# Patient Record
Sex: MALE | Race: WHITE | HISPANIC OR LATINO | Employment: STUDENT | URBAN - METROPOLITAN AREA
[De-identification: names, ages, dates, MRNs, and addresses within clinical notes are randomized per-mention and may not be internally consistent; named-entity substitution may affect disease eponyms.]

---

## 2023-03-21 LAB
ALBUMIN (G/DL) IN SER/PLAS: 4.7 G/DL (ref 3.4–5)
ANION GAP IN SER/PLAS: 15 MMOL/L (ref 10–20)
CALCIUM (MG/DL) IN SER/PLAS: 9.5 MG/DL (ref 8.6–10.6)
CARBON DIOXIDE, TOTAL (MMOL/L) IN SER/PLAS: 26 MMOL/L (ref 21–32)
CHLORIDE (MMOL/L) IN SER/PLAS: 104 MMOL/L (ref 98–107)
CREATININE (MG/DL) IN SER/PLAS: 0.91 MG/DL (ref 0.5–1.3)
GFR MALE: >90 ML/MIN/1.73M2
GLUCOSE (MG/DL) IN SER/PLAS: 68 MG/DL (ref 74–99)
HIV 1/ 2 AG/AB SCREEN: NONREACTIVE
PHOSPHATE (MG/DL) IN SER/PLAS: 4.4 MG/DL (ref 2.5–4.9)
POTASSIUM (MMOL/L) IN SER/PLAS: 4.2 MMOL/L (ref 3.5–5.3)
SODIUM (MMOL/L) IN SER/PLAS: 141 MMOL/L (ref 136–145)
SYPHILIS TOTAL AB: NONREACTIVE
UREA NITROGEN (MG/DL) IN SER/PLAS: 16 MG/DL (ref 6–23)

## 2023-03-22 LAB
CHLAMYDIA TRACH., AMPLIFIED: POSITIVE
N. GONORRHEA, AMPLIFIED: POSITIVE

## 2023-07-03 ENCOUNTER — TELEPHONE (OUTPATIENT)
Dept: PRIMARY CARE | Facility: CLINIC | Age: 23
End: 2023-07-03

## 2023-07-03 DIAGNOSIS — F32.A DEPRESSION, UNSPECIFIED DEPRESSION TYPE: ICD-10-CM

## 2023-07-03 RX ORDER — ESCITALOPRAM OXALATE 10 MG/1
10 TABLET ORAL DAILY
Qty: 90 TABLET | Refills: 1 | Status: SHIPPED | OUTPATIENT
Start: 2023-07-03 | End: 2023-07-07 | Stop reason: SDUPTHER

## 2023-07-03 RX ORDER — ESCITALOPRAM OXALATE 10 MG/1
1 TABLET ORAL DAILY
COMMUNITY
Start: 2021-12-27 | End: 2023-07-03 | Stop reason: SDUPTHER

## 2023-07-03 NOTE — TELEPHONE ENCOUNTER
Pt left a msg requesting a refill of his Escitalopram.  He will be going out of town on Sunday.  Pharm is [facility].

## 2023-07-07 DIAGNOSIS — F32.A DEPRESSION, UNSPECIFIED DEPRESSION TYPE: ICD-10-CM

## 2023-07-07 RX ORDER — ESCITALOPRAM OXALATE 10 MG/1
10 TABLET ORAL DAILY
Qty: 30 TABLET | Refills: 0 | Status: SHIPPED | OUTPATIENT
Start: 2023-07-07 | End: 2023-08-22 | Stop reason: SDUPTHER

## 2023-08-21 ENCOUNTER — TELEPHONE (OUTPATIENT)
Dept: PRIMARY CARE | Facility: CLINIC | Age: 23
End: 2023-08-21

## 2023-08-21 NOTE — TELEPHONE ENCOUNTER
Pt carla a msg stating that he was not getting his Escitalopram from [facility] due to his name.  I had the chart changed to reflect what [facility] has now.  Pt is requesting we re-send his script now.

## 2023-08-22 DIAGNOSIS — F32.A DEPRESSION, UNSPECIFIED DEPRESSION TYPE: ICD-10-CM

## 2023-08-22 LAB
ALBUMIN (G/DL) IN SER/PLAS: 5.3 G/DL (ref 3.4–5)
ANION GAP IN SER/PLAS: 19 MMOL/L (ref 10–20)
CALCIUM (MG/DL) IN SER/PLAS: 10 MG/DL (ref 8.6–10.6)
CARBON DIOXIDE, TOTAL (MMOL/L) IN SER/PLAS: 24 MMOL/L (ref 21–32)
CHLORIDE (MMOL/L) IN SER/PLAS: 99 MMOL/L (ref 98–107)
CREATININE (MG/DL) IN SER/PLAS: 0.8 MG/DL (ref 0.5–1.3)
GFR MALE: >90 ML/MIN/1.73M2
GLUCOSE (MG/DL) IN SER/PLAS: 64 MG/DL (ref 74–99)
HIV 1/ 2 AG/AB SCREEN: NONREACTIVE
PHOSPHATE (MG/DL) IN SER/PLAS: 3.9 MG/DL (ref 2.5–4.9)
POTASSIUM (MMOL/L) IN SER/PLAS: 3.7 MMOL/L (ref 3.5–5.3)
SODIUM (MMOL/L) IN SER/PLAS: 138 MMOL/L (ref 136–145)
SYPHILIS TOTAL AB: NONREACTIVE
UREA NITROGEN (MG/DL) IN SER/PLAS: 18 MG/DL (ref 6–23)

## 2023-08-22 RX ORDER — ESCITALOPRAM OXALATE 10 MG/1
10 TABLET ORAL DAILY
Qty: 90 TABLET | Refills: 3 | Status: SHIPPED | OUTPATIENT
Start: 2023-08-22 | End: 2023-12-18 | Stop reason: DRUGHIGH

## 2023-08-23 LAB
CHLAMYDIA TRACH., AMPLIFIED: NEGATIVE
N. GONORRHEA, AMPLIFIED: NEGATIVE
N. GONORRHEA, AMPLIFIED: NEGATIVE
N. GONORRHEA, AMPLIFIED: POSITIVE

## 2023-10-18 DIAGNOSIS — Z77.21 PERSONAL HISTORY OF EXPOSURE TO POTENTIALLY HAZARDOUS BODY FLUIDS: ICD-10-CM

## 2023-11-20 ENCOUNTER — CLINICAL SUPPORT (OUTPATIENT)
Dept: IMMUNOLOGY | Facility: CLINIC | Age: 23
End: 2023-11-20
Payer: COMMERCIAL

## 2023-11-20 DIAGNOSIS — Z77.21 PERSONAL HISTORY OF EXPOSURE TO POTENTIALLY HAZARDOUS BODY FLUIDS: ICD-10-CM

## 2023-11-20 LAB
ALBUMIN SERPL BCP-MCNC: 4.9 G/DL (ref 3.4–5)
ANION GAP SERPL CALC-SCNC: 14 MMOL/L (ref 10–20)
BUN SERPL-MCNC: 11 MG/DL (ref 6–23)
CALCIUM SERPL-MCNC: 9.6 MG/DL (ref 8.6–10.6)
CHLORIDE SERPL-SCNC: 103 MMOL/L (ref 98–107)
CO2 SERPL-SCNC: 29 MMOL/L (ref 21–32)
CREAT SERPL-MCNC: 0.95 MG/DL (ref 0.5–1.3)
GFR SERPL CREATININE-BSD FRML MDRD: >90 ML/MIN/1.73M*2
GLUCOSE SERPL-MCNC: 106 MG/DL (ref 74–99)
HIV 1+2 AB+HIV1 P24 AG SERPL QL IA: NONREACTIVE
PHOSPHATE SERPL-MCNC: 3.4 MG/DL (ref 2.5–4.9)
POTASSIUM SERPL-SCNC: 3.9 MMOL/L (ref 3.5–5.3)
SODIUM SERPL-SCNC: 142 MMOL/L (ref 136–145)
T PALLIDUM AB SER QL: NONREACTIVE

## 2023-11-20 PROCEDURE — 87800 DETECT AGNT MULT DNA DIREC: CPT

## 2023-11-20 PROCEDURE — 80069 RENAL FUNCTION PANEL: CPT

## 2023-11-20 PROCEDURE — 86780 TREPONEMA PALLIDUM: CPT

## 2023-11-20 PROCEDURE — 36415 COLL VENOUS BLD VENIPUNCTURE: CPT

## 2023-11-20 PROCEDURE — 87389 HIV-1 AG W/HIV-1&-2 AB AG IA: CPT

## 2023-11-21 LAB
C TRACH RRNA SPEC QL NAA+PROBE: NEGATIVE
N GONORRHOEA DNA SPEC QL PROBE+SIG AMP: NEGATIVE

## 2023-12-12 ENCOUNTER — TELEPHONE (OUTPATIENT)
Dept: PRIMARY CARE | Facility: CLINIC | Age: 23
End: 2023-12-12
Payer: COMMERCIAL

## 2023-12-12 DIAGNOSIS — Z77.21 PERSONAL HISTORY OF EXPOSURE TO POTENTIALLY HAZARDOUS BODY FLUIDS: ICD-10-CM

## 2023-12-15 ENCOUNTER — CLINICAL SUPPORT (OUTPATIENT)
Dept: IMMUNOLOGY | Facility: CLINIC | Age: 23
End: 2023-12-15
Payer: COMMERCIAL

## 2023-12-15 DIAGNOSIS — Z77.21 PERSONAL HISTORY OF EXPOSURE TO POTENTIALLY HAZARDOUS BODY FLUIDS: ICD-10-CM

## 2023-12-15 LAB — T PALLIDUM AB SER QL: NONREACTIVE

## 2023-12-15 PROCEDURE — 87800 DETECT AGNT MULT DNA DIREC: CPT

## 2023-12-15 PROCEDURE — 86780 TREPONEMA PALLIDUM: CPT

## 2023-12-15 PROCEDURE — 36415 COLL VENOUS BLD VENIPUNCTURE: CPT

## 2023-12-16 LAB
C TRACH RRNA SPEC QL NAA+PROBE: NEGATIVE
N GONORRHOEA DNA SPEC QL PROBE+SIG AMP: NEGATIVE

## 2023-12-18 ENCOUNTER — TELEMEDICINE (OUTPATIENT)
Dept: PRIMARY CARE | Facility: CLINIC | Age: 23
End: 2023-12-18
Payer: COMMERCIAL

## 2023-12-18 DIAGNOSIS — F32.A DEPRESSION, UNSPECIFIED DEPRESSION TYPE: Primary | ICD-10-CM

## 2023-12-18 PROCEDURE — 99213 OFFICE O/P EST LOW 20 MIN: CPT | Performed by: INTERNAL MEDICINE

## 2023-12-18 RX ORDER — ESCITALOPRAM OXALATE 20 MG/1
20 TABLET ORAL DAILY
Qty: 90 TABLET | Refills: 2 | Status: SHIPPED | OUTPATIENT
Start: 2023-12-18 | End: 2024-06-15

## 2023-12-18 NOTE — PROGRESS NOTES
Subjective   Patient ID: KIRK Hugo is a 23 y.o. male who presents for No chief complaint on file..    HPI   Follow-up depression.  Subtle but some increasing symptoms over the past 2 to 3 months.  No clear precipitators.  Able to function.  Doing well in school.  Exercising.  Diet good.  Sleeping well.  Denies any substance use.  Denies suicidal or homicidal thinking.    Review of Systems  All systems reviewed and negative except as per history of present illness  Objective   There were no vitals taken for this visit.    Physical Exam    Assessment/Plan   Problem List Items Addressed This Visit    None  Visit Diagnoses         Codes    Depression, unspecified depression type    -  Primary F32.A    Relevant Medications    escitalopram (Lexapro) 20 mg tablet        Some increase signs and symptoms.  Will increase escitalopram.  Reviewed risk of suicidal thoughts over the first several weeks to months.  Continued exercise.  Recommend continued follow-up with psychology.  Follow-up 2 to 3 months.

## 2024-01-15 DIAGNOSIS — Z77.21 PERSONAL HISTORY OF EXPOSURE TO POTENTIALLY HAZARDOUS BODY FLUIDS: ICD-10-CM

## 2024-01-16 ENCOUNTER — CLINICAL SUPPORT (OUTPATIENT)
Dept: IMMUNOLOGY | Facility: CLINIC | Age: 24
End: 2024-01-16
Payer: COMMERCIAL

## 2024-01-16 DIAGNOSIS — Z77.21 PERSONAL HISTORY OF EXPOSURE TO POTENTIALLY HAZARDOUS BODY FLUIDS: ICD-10-CM

## 2024-01-16 LAB
ALBUMIN SERPL BCP-MCNC: 5.1 G/DL (ref 3.4–5)
ANION GAP SERPL CALC-SCNC: 14 MMOL/L (ref 10–20)
BUN SERPL-MCNC: 14 MG/DL (ref 6–23)
CALCIUM SERPL-MCNC: 9.5 MG/DL (ref 8.6–10.6)
CHLORIDE SERPL-SCNC: 102 MMOL/L (ref 98–107)
CO2 SERPL-SCNC: 28 MMOL/L (ref 21–32)
CREAT SERPL-MCNC: 0.78 MG/DL (ref 0.5–1.3)
EGFRCR SERPLBLD CKD-EPI 2021: >90 ML/MIN/1.73M*2
GLUCOSE SERPL-MCNC: 117 MG/DL (ref 74–99)
HIV 1+2 AB+HIV1 P24 AG SERPL QL IA: NONREACTIVE
PHOSPHATE SERPL-MCNC: 4 MG/DL (ref 2.5–4.9)
POTASSIUM SERPL-SCNC: 4.3 MMOL/L (ref 3.5–5.3)
SODIUM SERPL-SCNC: 140 MMOL/L (ref 136–145)
TREPONEMA PALLIDUM IGG+IGM AB [PRESENCE] IN SERUM OR PLASMA BY IMMUNOASSAY: NONREACTIVE

## 2024-01-16 PROCEDURE — 80069 RENAL FUNCTION PANEL: CPT

## 2024-01-16 PROCEDURE — 36415 COLL VENOUS BLD VENIPUNCTURE: CPT

## 2024-01-16 PROCEDURE — 87800 DETECT AGNT MULT DNA DIREC: CPT

## 2024-01-16 PROCEDURE — 87389 HIV-1 AG W/HIV-1&-2 AB AG IA: CPT

## 2024-01-16 PROCEDURE — 86780 TREPONEMA PALLIDUM: CPT

## 2024-01-17 LAB
C TRACH RRNA SPEC QL NAA+PROBE: NEGATIVE
N GONORRHOEA DNA SPEC QL PROBE+SIG AMP: NEGATIVE

## 2024-03-11 ENCOUNTER — OFFICE VISIT (OUTPATIENT)
Dept: IMMUNOLOGY | Facility: CLINIC | Age: 24
End: 2024-03-11
Payer: COMMERCIAL

## 2024-03-11 VITALS
RESPIRATION RATE: 16 BRPM | OXYGEN SATURATION: 97 % | BODY MASS INDEX: 25.69 KG/M2 | SYSTOLIC BLOOD PRESSURE: 133 MMHG | TEMPERATURE: 94.3 F | DIASTOLIC BLOOD PRESSURE: 81 MMHG | WEIGHT: 164 LBS | HEART RATE: 65 BPM

## 2024-03-11 DIAGNOSIS — Z77.21 EXPOSURE TO POTENTIALLY HAZARDOUS BODY FLUIDS: ICD-10-CM

## 2024-03-11 DIAGNOSIS — Z79.899 HIGH RISK MEDICATION USE: ICD-10-CM

## 2024-03-11 LAB
ALBUMIN SERPL BCP-MCNC: 5.1 G/DL (ref 3.4–5)
ANION GAP SERPL CALC-SCNC: 12 MMOL/L (ref 10–20)
BUN SERPL-MCNC: 10 MG/DL (ref 6–23)
CALCIUM SERPL-MCNC: 9.9 MG/DL (ref 8.6–10.6)
CHLORIDE SERPL-SCNC: 101 MMOL/L (ref 98–107)
CO2 SERPL-SCNC: 31 MMOL/L (ref 21–32)
CREAT SERPL-MCNC: 0.79 MG/DL (ref 0.5–1.3)
EGFRCR SERPLBLD CKD-EPI 2021: >90 ML/MIN/1.73M*2
GLUCOSE SERPL-MCNC: 94 MG/DL (ref 74–99)
HIV 1+2 AB+HIV1 P24 AG SERPL QL IA: NONREACTIVE
PHOSPHATE SERPL-MCNC: 2.9 MG/DL (ref 2.5–4.9)
POTASSIUM SERPL-SCNC: 4.1 MMOL/L (ref 3.5–5.3)
SODIUM SERPL-SCNC: 140 MMOL/L (ref 136–145)
TREPONEMA PALLIDUM IGG+IGM AB [PRESENCE] IN SERUM OR PLASMA BY IMMUNOASSAY: NONREACTIVE

## 2024-03-11 PROCEDURE — 99214 OFFICE O/P EST MOD 30 MIN: CPT | Performed by: NURSE PRACTITIONER

## 2024-03-11 PROCEDURE — 36415 COLL VENOUS BLD VENIPUNCTURE: CPT | Performed by: NURSE PRACTITIONER

## 2024-03-11 PROCEDURE — 87800 DETECT AGNT MULT DNA DIREC: CPT | Performed by: NURSE PRACTITIONER

## 2024-03-11 PROCEDURE — 80069 RENAL FUNCTION PANEL: CPT | Performed by: NURSE PRACTITIONER

## 2024-03-11 PROCEDURE — 1036F TOBACCO NON-USER: CPT | Performed by: NURSE PRACTITIONER

## 2024-03-11 PROCEDURE — 87389 HIV-1 AG W/HIV-1&-2 AB AG IA: CPT | Performed by: NURSE PRACTITIONER

## 2024-03-11 PROCEDURE — 86780 TREPONEMA PALLIDUM: CPT | Performed by: NURSE PRACTITIONER

## 2024-03-11 RX ORDER — ALBUTEROL SULFATE 90 UG/1
AEROSOL, METERED RESPIRATORY (INHALATION)
COMMUNITY
Start: 2016-09-21

## 2024-03-11 RX ORDER — EMTRICITABINE AND TENOFOVIR ALAFENAMIDE 200; 25 MG/1; MG/1
TABLET ORAL
COMMUNITY
Start: 2022-01-11 | End: 2024-03-27 | Stop reason: SDUPTHER

## 2024-03-11 ASSESSMENT — PAIN SCALES - GENERAL: PAINLEVEL: 0-NO PAIN

## 2024-03-11 NOTE — LETTER
03/12/24    Haydee Green MD  [address]  [facility]  [facility] [address]      Dear Dr. Haydee Green MD,    I am writing to confirm that your patient, Sidney Hugo, received care in my office on 03/12/24. I have enclosed a summary of the care provided to Sidney for your reference.    Please contact me with any questions you may have regarding the visit.    Sincerely,         Jasmyn Elliott, APRN-CNP  [address]  [address]  [facility] [address] [phone]    CC: No Recipients

## 2024-03-11 NOTE — PROGRESS NOTES
HIV PrEP Clinic Follow-up Visit:    S. Sidney Hugo is a 23 y.o. male being seen for PrEP for prevention of HIV infection.  Current PrEP therapy:  Descovy    Risk factors for HIV infection include: (select all that apply):  MSM    Regular condom use? Never  Received treatment for STD since last seen? No    Past Medical History  History reviewed. No pertinent past medical history.    Allergies  No Known Allergies    Current Medications:    Current Outpatient Medications:     albuterol 90 mcg/actuation inhaler, Inhale., Disp: , Rfl:     Descovy 200-25 mg tablet, Take by mouth., Disp: , Rfl:     escitalopram (Lexapro) 20 mg tablet, Take 1 tablet (20 mg) by mouth once daily., Disp: 90 tablet, Rfl: 2    Immunizations:  Immunization History   Administered Date(s) Administered    DTP 02/07/2001, 03/29/2001, 06/05/2001, 12/17/2001, 08/07/2006    Flu vaccine (IIV4), preservative free *Check age/dose* 10/13/2020, 10/12/2021    HPV 9-valent vaccine (GARDASIL 9) 08/18/2017    HPV, Quadrivalent 09/21/2016    Hepatitis B vaccine, adult (RECOMBIVAX, ENGERIX) 2000, 02/07/2001, 09/05/2001    MMR vaccine, subcutaneous (MMR II) 12/17/2001, 08/07/2006    Meningococcal MCV4P 04/29/2014, 06/21/2017    OPV 02/07/2001, 03/29/2001, 06/05/2001, 12/17/2001    PPD Test 05/18/2014, 06/11/2014    Pfizer Purple Cap SARS-CoV-2 04/01/2021, 04/22/2021    Pneumococcal polysaccharide vaccine, 23-valent, age 2 years and older (PNEUMOVAX 23) 02/07/2001, 03/29/2001, 06/05/2001    Poliovirus vaccine, subcutaneous (IPOL) 02/07/2001, 03/29/2001, 09/05/2001, 08/07/2006    Tdap vaccine, age 7 year and older (BOOSTRIX, ADACEL) 12/09/2011    Varicella vaccine, subcutaneous (VARIVAX) 12/17/2001, 08/07/2006       Review of systems  Review of Systems   Constitutional: Negative.    HENT: Negative.     Eyes: Negative.    Respiratory: Negative.     Cardiovascular: Negative.    Gastrointestinal: Negative.    Endocrine: Negative.    Genitourinary:  "Negative.    Musculoskeletal: Negative.    Allergic/Immunologic: Negative.    Neurological: Negative.    Hematological: Negative.    Psychiatric/Behavioral: Negative.         PHYSICAL EXAMINATION:  Visit Vitals  /81   Pulse 65   Temp 34.6 °C (94.3 °F)   Resp 16   Wt 74.4 kg (164 lb)   SpO2 97%   BMI 25.69 kg/m²   Smoking Status Never   BSA 1.88 m²       Physical Exam   Physical Exam  Vitals reviewed.   Constitutional:       Appearance: Normal appearance.   HENT:      Head: Normocephalic.   Cardiovascular:      Rate and Rhythm: Normal rate and regular rhythm.      Heart sounds: Normal heart sounds.   Pulmonary:      Effort: Pulmonary effort is normal.      Breath sounds: Normal breath sounds.   Abdominal:      General: Bowel sounds are normal.      Palpations: Abdomen is soft.   Musculoskeletal:         General: Normal range of motion.      Cervical back: Neck supple.   Skin:     General: Skin is warm and dry.   Neurological:      Mental Status: He is alert and oriented to person, place, and time.   Psychiatric:         Mood and Affect: Mood normal.         Pertinent data review:  HIV 1/2 Antigen/Antibody Screen with Reflex to Confirmation   Date Value Ref Range Status   01/16/2024 Nonreactive Nonreactive Final     Syphilis Total Ab   Date Value Ref Range Status   01/16/2024 Nonreactive Nonreactive Final     Comment:     No significant level of Treponema pallidum antibody detected.   Repeat testing in 2 to 4 weeks may be considered if early   infection or incubating syphilis infection is suspected.     No results found for: \"VDRLCSF\"  No results found for: \"RPR\"  Hepatitis C Ab   Date Value Ref Range Status   10/13/2020 NONREACTIVE NONREACTIVE Final     Comment:      Results from patients taking biotin supplements or receiving   high-dose biotin therapy should be interpreted with caution   due to possible interference with this test. Providers may    contact their local laboratory for further information.   "     Hepatitis B Surface Ag   Date Value Ref Range Status   10/13/2020 NONREACTIVE NONREACTIVE Final     Comment:      Biotin interference may cause falsely decreased results.   Patients taking a Biotin dose of up to 5 mg/day should   refrain from taking Biotin for 24 hours before sample   collection. Providers may contact their local laboratory   for further information.       Hepatitis B Surface Ab   Date Value Ref Range Status   10/13/2020 <3.1 <10 mIU/mL Final     Comment:     INTERPRETIVE CRITERIA:  <10 mIU/mL....NONREACTIVE    >=10 mIU/mL...REACTIVE   .   Biotin interference may cause falsely decreased results.   Patients taking a Biotin dose of up to 5 mg/day should   refrain from taking Biotin for 24 hours before sample   collection. Providers may contact their local laboratory   for further information.       Hep A Total Ab Interp   Date Value Ref Range Status   10/13/2020 NONREACTIVE NONREACTIVE Final     Comment:      Biotin interference may cause falsely elevated results.    Patients taking a Biotin dose of up to 5 mg/day should    refrain from taking Biotin for 24 hours before sample    collection. Providers may contact their local laboratory   for further information.       Glucose   Date Value Ref Range Status   01/16/2024 117 (H) 74 - 99 mg/dL Final     Sodium   Date Value Ref Range Status   01/16/2024 140 136 - 145 mmol/L Final     Potassium   Date Value Ref Range Status   01/16/2024 4.3 3.5 - 5.3 mmol/L Final     Chloride   Date Value Ref Range Status   01/16/2024 102 98 - 107 mmol/L Final     Anion Gap   Date Value Ref Range Status   01/16/2024 14 10 - 20 mmol/L Final     Urea Nitrogen   Date Value Ref Range Status   01/16/2024 14 6 - 23 mg/dL Final     Creatinine   Date Value Ref Range Status   01/16/2024 0.78 0.50 - 1.30 mg/dL Final     eGFR   Date Value Ref Range Status   01/16/2024 >90 >60 mL/min/1.73m*2 Final     Comment:     Calculations of estimated GFR are performed using the 2021 CKD-EPI  Study Refit equation without the race variable for the IDMS-Traceable creatinine methods.  https://jasn.asnjournals.org/content/early/2021/09/22/ASN.3207105387     Calcium   Date Value Ref Range Status   01/16/2024 9.5 8.6 - 10.6 mg/dL Final     Phosphorus   Date Value Ref Range Status   01/16/2024 4.0 2.5 - 4.9 mg/dL Final     Comment:     The performance characteristics of phosphorus testing in heparinized plasma have been validated by the individual  laboratory site where testing is performed. Testing on heparinized plasma is not approved by the FDA; however, such approval is not necessary.     Albumin   Date Value Ref Range Status   01/16/2024 5.1 (H) 3.4 - 5.0 g/dL Final       Assessment and Plan:  KIRK Hugo is a 23 y.o.male seen today for evaluation of appropriateness for continuation of  PrEP therapy as they continue to be at greater risk for acquiring HIV infection.  HIV Ag/Ab, Syphilis testing, and renal function will be collected today.  GC NAAT testing will be obtained from 2 sites, throat, rectum.  If HIV testing negative, prescription for PrEP will be renewed.    Routine blood and swabs today.  Labs again in 3 months.  Face to face in 6 months.   He is interested in doxy prep - will prescribe for him.  Problem List Items Addressed This Visit    None  Visit Diagnoses       Exposure to potentially hazardous body fluids        Relevant Orders    C. Trachomatis / N. Gonorrhoeae, Amplified Detection    HIV 1/2 Antigen/Antibody Screen with Reflex to Confirmation    Syphilis Screen with Reflex    C. Trachomatis / N. Gonorrhoeae, Amplified Detection    C. Trachomatis / N. Gonorrhoeae, Amplified Detection    High risk medication use        Relevant Orders    Renal Function Panel        Thank you for coming in to see us.   We will see you face to face again in 6 months.   I will be ordering Doxy Prep for you.    Please call with any questions or concerns.     Jasmyn Elliott  APRN-CNP

## 2024-03-12 DIAGNOSIS — Z77.21 PERSONAL HISTORY OF EXPOSURE TO POTENTIALLY HAZARDOUS BODY FLUIDS: Primary | ICD-10-CM

## 2024-03-12 LAB
C TRACH RRNA SPEC QL NAA+PROBE: NEGATIVE
N GONORRHOEA DNA SPEC QL PROBE+SIG AMP: NEGATIVE

## 2024-03-12 RX ORDER — DOXYCYCLINE HYCLATE 100 MG
TABLET ORAL
Qty: 30 TABLET | Refills: 0 | Status: SHIPPED | OUTPATIENT
Start: 2024-03-12

## 2024-03-12 ASSESSMENT — ENCOUNTER SYMPTOMS
GASTROINTESTINAL NEGATIVE: 1
NEUROLOGICAL NEGATIVE: 1
ENDOCRINE NEGATIVE: 1
PSYCHIATRIC NEGATIVE: 1
CARDIOVASCULAR NEGATIVE: 1
EYES NEGATIVE: 1
MUSCULOSKELETAL NEGATIVE: 1
ALLERGIC/IMMUNOLOGIC NEGATIVE: 1
CONSTITUTIONAL NEGATIVE: 1
HEMATOLOGIC/LYMPHATIC NEGATIVE: 1
RESPIRATORY NEGATIVE: 1

## 2024-03-18 DIAGNOSIS — J34.2 DEVIATED SEPTUM: Primary | ICD-10-CM

## 2024-03-27 DIAGNOSIS — Z77.21 PERSONAL HISTORY OF EXPOSURE TO POTENTIALLY HAZARDOUS BODY FLUIDS: Primary | ICD-10-CM

## 2024-03-27 RX ORDER — EMTRICITABINE AND TENOFOVIR ALAFENAMIDE 200; 25 MG/1; MG/1
1 TABLET ORAL DAILY
Qty: 30 TABLET | Refills: 2 | Status: SHIPPED | OUTPATIENT
Start: 2024-03-27 | End: 2024-04-26

## 2024-05-03 ENCOUNTER — TELEMEDICINE (OUTPATIENT)
Dept: PRIMARY CARE | Facility: CLINIC | Age: 24
End: 2024-05-03
Payer: COMMERCIAL

## 2024-05-03 DIAGNOSIS — K52.9 CHRONIC DIARRHEA OF UNKNOWN ORIGIN: Primary | ICD-10-CM

## 2024-05-03 PROBLEM — A74.9 CHLAMYDIA: Status: RESOLVED | Noted: 2024-05-03 | Resolved: 2024-05-03

## 2024-05-03 PROBLEM — A54.9 GONORRHEA: Status: RESOLVED | Noted: 2024-05-03 | Resolved: 2024-05-03

## 2024-05-03 PROBLEM — J45.990 EXERCISE-INDUCED ASTHMA (HHS-HCC): Status: ACTIVE | Noted: 2024-05-03

## 2024-05-03 PROBLEM — D72.829 ELEVATED WBC COUNT: Status: RESOLVED | Noted: 2024-05-03 | Resolved: 2024-05-03

## 2024-05-03 PROBLEM — F32.A DEPRESSION: Status: ACTIVE | Noted: 2024-05-03

## 2024-05-03 PROBLEM — D50.9 IRON DEFICIENCY ANEMIA: Status: RESOLVED | Noted: 2024-05-03 | Resolved: 2024-05-03

## 2024-05-03 PROCEDURE — 1036F TOBACCO NON-USER: CPT | Performed by: FAMILY MEDICINE

## 2024-05-03 PROCEDURE — 99214 OFFICE O/P EST MOD 30 MIN: CPT | Performed by: FAMILY MEDICINE

## 2024-05-03 ASSESSMENT — ENCOUNTER SYMPTOMS
VOMITING: 0
DIARRHEA: 1
WEIGHT LOSS: 1

## 2024-05-03 NOTE — PROGRESS NOTES
"Subjective   Patient ID: KIRK Hugo \"Purvi" is a 23 y.o. male who presents for Diarrhea.    KIRK Hugo presents for a scheduled Telemedicine visit for chronic diarrhea associated with weight loss. . Patient seen via synchronous audio and video platform. Patient is located at home and I am in my office. Patient consents to being seen via telemedicine platform, understands the limitations of the platform, and understands that he can be seen in office if preferred.     On 4/12, woke up with stomach pain and diarrhea. Was improving. For a few days earlier this week. Fever started up on 4/29 to 4/30. That cleared up. Feeling ok physically. Diarrhea now is up to 3-4 times a day, large volume in AM, no blood currently but had some initially. Mucous occasionally. Nausea occasionally, mostly the first weekend. Vomitied when it first began.     Diarrhea   Chronicity: since 4/12. The current episode started 1 to 4 weeks ago. The problem occurs 2 to 4 times per day. The problem has been gradually worsening. The patient states that diarrhea awakens him from sleep. Associated symptoms include weight loss. Pertinent negatives include no vomiting. Associated symptoms comments: Has lost per his scale 10-12 lbs in 3 weeks  Not taking any medication now. .          Current Outpatient Medications:     albuterol 90 mcg/actuation inhaler, Inhale., Disp: , Rfl:     doxycycline (Vibra-Tabs) 100 mg tablet, Take 2 tablets after unprotected sexual encounter. Not to be taken daily., Disp: 30 tablet, Rfl: 0    escitalopram (Lexapro) 20 mg tablet, Take 1 tablet (20 mg) by mouth once daily., Disp: 90 tablet, Rfl: 2    Patient Active Problem List   Diagnosis    Depression    Exercise-induced asthma ([facility])    Chronic diarrhea of unknown origin         Review of Systems   Constitutional:  Positive for weight loss.   Gastrointestinal:  Positive for diarrhea. Negative for vomiting.       Objective   There were no " vitals taken for this visit.    Physical Exam  Constitutional:       Appearance: Normal appearance.   Pulmonary:      Effort: Pulmonary effort is normal.   Neurological:      Mental Status: He is alert.   Psychiatric:         Mood and Affect: Mood normal.         Behavior: Behavior normal.         Assessment/Plan   Problem List Items Addressed This Visit       Chronic diarrhea of unknown origin - Primary     Since 4/12. Had improved some but now is back, febrile for couple days this week but better. Wakes him up Has lost 10-12 lbs. Discussed importance of in person eval, differential including infection, inflammatory bowel disease, other systemic issues. He agreed to go to Urgent Care today as cannot be seen by his PCP today.               Assessment, plans, tests, and follow up discussed with patient and patient verbalized understanding. KIRK Little was given an opportunity to ask questions and  any concerns were addressed including but not limited to diffferential, red flags (weight loss, waking up at night, fever,), and importance of prompt in person evaluation. .

## 2024-05-03 NOTE — ASSESSMENT & PLAN NOTE
Since 4/12. Had improved some but now is back, febrile for couple days this week but better. Wakes him up Has lost 10-12 lbs. Discussed importance of in person eval, differential including infection, inflammatory bowel disease, other systemic issues. He agreed to go to Urgent Care today as cannot be seen by his PCP today.

## 2024-05-09 ENCOUNTER — OFFICE VISIT (OUTPATIENT)
Dept: OTOLARYNGOLOGY | Facility: CLINIC | Age: 24
End: 2024-05-09
Payer: COMMERCIAL

## 2024-05-09 VITALS — BODY MASS INDEX: 24.28 KG/M2 | WEIGHT: 155 LBS

## 2024-05-09 DIAGNOSIS — G47.33 OSA (OBSTRUCTIVE SLEEP APNEA): ICD-10-CM

## 2024-05-09 DIAGNOSIS — M95.0 ACQUIRED DEFORMITY OF NOSE: Primary | ICD-10-CM

## 2024-05-09 DIAGNOSIS — J34.2 DEVIATED SEPTUM: ICD-10-CM

## 2024-05-09 DIAGNOSIS — M95.0 NASAL VALVE COLLAPSE: ICD-10-CM

## 2024-05-09 PROCEDURE — 99204 OFFICE O/P NEW MOD 45 MIN: CPT | Performed by: GENERAL PRACTICE

## 2024-05-09 PROCEDURE — 31231 NASAL ENDOSCOPY DX: CPT | Performed by: GENERAL PRACTICE

## 2024-05-13 NOTE — PROGRESS NOTES
Otolaryngology - Head and Neck Surgery Outpatient New Patient Visit Note        Assessment/Plan   Problem List Items Addressed This Visit    None  Visit Diagnoses         Codes    Acquired deformity of nose    -  Primary M95.0    Deviated septum     J34.2    Nasal valve collapse     M95.0    AVNI (obstructive sleep apnea)     G47.33            23yoM with R>L nasal obstruction associated with severe deviated septum as well as leftward nasal deviation and L>R dynamic collapse.   Nasal obstruction contributing to witnessed sleep apnea.  No significant rhinitis/sinusitis on exam or nasal endoscopy, though some spring allergic rhinitis history.  Discussed controls with antihistamines PRN.     Discussed options to include observation, controls for rhinitis PRN, septoturbinoplasty (though this will not be able to relief all of nasal obstruction due to high and anterior deflections) or rhinoplasty.  Pt prefers septoplasty and turbinoplasty and latera graft, knowing that some residual structural causes for obstruction will remain.     Risks, benefits and indications of the procedure were discussed.  This included discussion of risks of pain, bleeding, infection, scarring with poor functional or cosmetic result, need for future procedures, or damage to surrounding structures to include regional blood vessels and nerves.  In particular, the risk of ongoing obstruction CSF leak, septal perforation was highlighted.  All questions were answered.  The patient/caregiver indicated understanding of the discussion and elected to proceed with the procedure.  Will schedule as available.       Follow up:  -plan for follow up in clinic as needed    All of the above findings, impressions, treatment planning and follow up plans were discussed with the patient who indicated understanding.  the patient was instructed to contact or return to clinic sooner if symptoms/signs persist or worsen despite the above management.      Lobito Jackson  "MD  Otolaryngology - Head and Neck Surgery            History Of Present Illness  SAlmita Hugo \"Sidney\" is a 23 y.o. male presenting for concerns of nasal obstruction.     Reports longstanding R>L nasal obstruction and mouthbreathing.    Reports some history of nasal trauma, uncertain of nasal fractures.     Reports a history of allergic rhinitis in the spring, but symptoms have not notably started yet.  Uses zyrtec PRN.   No significant sinusitis history.    Reports a history of snoring and witnessed apneic pauses.      Reports a prior hsitroy of recurrent epistaxis, but none recently.                  Past Medical History  He has a past medical history of Chlamydia (05/03/2024), Elevated WBC count (05/03/2024), Gonorrhea (05/03/2024), and Iron deficiency anemia (05/03/2024).    Surgical History  He has no past surgical history on file.     Social History  He reports that he has never smoked. He has never used smokeless tobacco. He reports current alcohol use. He reports current drug use. Drug: Marijuana.    Family History  No family history on file.     Allergies  Patient has no known allergies.    Review of Systems  ROS: Pertinent positives as noted in HPI.    - CONSTITUTIONAL: Does not report weight loss, fever or chills.    - HEENT:   Ear: Does not report tinnitus, vertigo, hearing loss, otalgia, otorrhea  Nose: Does not report  ,  , epistaxis, decreased smell  Throat: Does not report pain, dysphagia, odynophagia  Larynx: Does not report hoarseness,  difficulty breathing, pain with speaking (odynophonia)  Neck: Does not report new masses, pain, swelling  Face: Does not report sinus pain, pressure, swelling, numbness, weakness     - RESPIRATORY: Does not report SOB or cough.    - CV: Does not report palpitations or chest pain.     - GI: Does not report abdominal pain, nausea, vomiting or diarrhea.    - : Does not report dysuria or urinary frequency.    - MSK: Does not report myalgia or joint " pain.    - SKIN: Does not report rash or pruritus.    - NEUROLOGICAL: Does not report headache or syncope.    - PSYCHIATRIC: Does not report recent changes in mood. Does not report anxiety or depression.         Physical Exam:     GENERAL:   Alert & Oriented to person, place and time; Normal affect and appearance. Well developed and well nourished. Conversant & cooperative with examination.     HEAD:   Normocephalic, atraumatic. No sinus tenderness to palpation. Normal parotid bilaterally. Normal facial strength.     NEUROLOGIC:   Cranial nerves II-XII grossly intact, gait WNL. Normal mood and affect.    EYES:   Extraocular movements intact. Pupils equal, round, reactive to light and accommodation. No nystagmus, no ptosis. no scleral injection.    EAR:   Normal auricle. No discomfort or TTP with manipulation.   Handheld otoscopic exam showed normal external auditory canals bilaterally. No purulence or EAC inflammation. Minimal cerumen.   Right tympanic membrane clear and mobile without evidence of perforation, retraction or middle ear effusion.   Left tympanic membrane clear and mobile without evidence of perforation, retraction or middle ear effusion.     NOSE:   Leftward external deformity from upper 1/3.  Over projected, underrotated tip.  L>R dynamic collapse on inspiration.  . No external nasal lesions, lacerations, or scars. Nasal tip symmetrical  Nasal cavity with severe rightward deviation of septum to include far anterior and high septum, normal mucosa and turbinates. No lesions, masses, purulence or polyps.     OC/OP:   Mucous membranes moist, no masses, lesions or exudates.   Normal tongue, floor of mouth, teeth, gums, lips. Normal posterior pharyngeal wall.    Normal tonsils without erythema, exudate or obvious calculi     NECK:   No neck masses or thyroid enlargement. Trachea midline. No tenderness to palpation    LYMPHATIC:   No cervical lymphadenopathy.     RESPIRATORY:   Symmetric chest elevation &  no retractions. No significant hoarseness. No increased work of breathing.    CV:   No clubbing or cyanosis. No obvious edema    Skin:   No facial rashes, vesicles or lesions.     Extremities:   No gross abnormalities      Clinic Procedure    Nasal Endoscopy           Nasal Endoscopy Indication:    Risks, benefits, and alternatives, infection risk, bleeding risk and risk of mucosal trauma and pain were discussed with the patient. Verbal consent was obtained prior to the procedure and is detailed in the patient's record.     Procedure Note:      With the patient seated in the exam chair, the bilateral nasal cavity was topically treated with 4% lidocaine and phenylephrine.  The bilateral nasal cavity was intubated with the  flexible laryngoscope.   Nasal Endoscopy: Nasal endoscopy was successfully completed using the endoscope and the nasal mucosa, septum, turbinates, and osteomeatal complex were examined.  The spheno-ethmoid recess was examined.          Patient Status: the patient tolerated the procedure well.   Complications: there were no complications.   Significant/abnormal findings: No significant abnormal findings during the above exam, except as listed in physical exam       Information review:  External sources (notes, imaging, lab results) listed below personally reviewed to aid in medical decision making process.  -  -  -

## 2024-06-10 ENCOUNTER — APPOINTMENT (OUTPATIENT)
Dept: IMMUNOLOGY | Facility: CLINIC | Age: 24
End: 2024-06-10
Payer: COMMERCIAL

## 2024-06-11 ENCOUNTER — TELEPHONE (OUTPATIENT)
Dept: IMMUNOLOGY | Facility: CLINIC | Age: 24
End: 2024-06-11

## 2024-06-11 ENCOUNTER — OFFICE VISIT (OUTPATIENT)
Dept: IMMUNOLOGY | Facility: CLINIC | Age: 24
End: 2024-06-11
Payer: COMMERCIAL

## 2024-06-11 VITALS
BODY MASS INDEX: 24.92 KG/M2 | DIASTOLIC BLOOD PRESSURE: 76 MMHG | RESPIRATION RATE: 16 BRPM | HEIGHT: 67 IN | HEART RATE: 68 BPM | TEMPERATURE: 97.7 F | OXYGEN SATURATION: 97 % | WEIGHT: 158.8 LBS | SYSTOLIC BLOOD PRESSURE: 116 MMHG

## 2024-06-11 DIAGNOSIS — Z11.3 ROUTINE SCREENING FOR STI (SEXUALLY TRANSMITTED INFECTION): ICD-10-CM

## 2024-06-11 DIAGNOSIS — Z79.899 HIGH RISK MEDICATION USE: ICD-10-CM

## 2024-06-11 DIAGNOSIS — Z77.21 EXPOSURE TO POTENTIALLY HAZARDOUS BODY FLUIDS: ICD-10-CM

## 2024-06-11 LAB
ALBUMIN SERPL BCP-MCNC: 4.9 G/DL (ref 3.4–5)
ANION GAP SERPL CALC-SCNC: 14 MMOL/L (ref 10–20)
BUN SERPL-MCNC: 15 MG/DL (ref 6–23)
CALCIUM SERPL-MCNC: 9.6 MG/DL (ref 8.6–10.6)
CHLORIDE SERPL-SCNC: 103 MMOL/L (ref 98–107)
CO2 SERPL-SCNC: 28 MMOL/L (ref 21–32)
CREAT SERPL-MCNC: 0.78 MG/DL (ref 0.5–1.3)
EGFRCR SERPLBLD CKD-EPI 2021: >90 ML/MIN/1.73M*2
GLUCOSE SERPL-MCNC: 86 MG/DL (ref 74–99)
HIV 1+2 AB+HIV1 P24 AG SERPL QL IA: NONREACTIVE
PHOSPHATE SERPL-MCNC: 4.2 MG/DL (ref 2.5–4.9)
POTASSIUM SERPL-SCNC: 4 MMOL/L (ref 3.5–5.3)
SODIUM SERPL-SCNC: 141 MMOL/L (ref 136–145)
TREPONEMA PALLIDUM IGG+IGM AB [PRESENCE] IN SERUM OR PLASMA BY IMMUNOASSAY: NONREACTIVE

## 2024-06-11 PROCEDURE — 99214 OFFICE O/P EST MOD 30 MIN: CPT | Performed by: NURSE PRACTITIONER

## 2024-06-11 PROCEDURE — 1036F TOBACCO NON-USER: CPT | Performed by: NURSE PRACTITIONER

## 2024-06-11 PROCEDURE — 87389 HIV-1 AG W/HIV-1&-2 AB AG IA: CPT | Performed by: NURSE PRACTITIONER

## 2024-06-11 PROCEDURE — 80069 RENAL FUNCTION PANEL: CPT | Performed by: NURSE PRACTITIONER

## 2024-06-11 PROCEDURE — 36415 COLL VENOUS BLD VENIPUNCTURE: CPT | Performed by: NURSE PRACTITIONER

## 2024-06-11 PROCEDURE — 87491 CHLMYD TRACH DNA AMP PROBE: CPT | Performed by: NURSE PRACTITIONER

## 2024-06-11 PROCEDURE — 86780 TREPONEMA PALLIDUM: CPT | Performed by: NURSE PRACTITIONER

## 2024-06-11 RX ORDER — EMTRICITABINE AND TENOFOVIR ALAFENAMIDE 120; 15 MG/1; MG/1
TABLET ORAL
COMMUNITY
End: 2024-06-13 | Stop reason: ENTERED-IN-ERROR

## 2024-06-11 ASSESSMENT — ENCOUNTER SYMPTOMS
CARDIOVASCULAR NEGATIVE: 1
ENDOCRINE NEGATIVE: 1
PSYCHIATRIC NEGATIVE: 1
ALLERGIC/IMMUNOLOGIC NEGATIVE: 1
HEMATOLOGIC/LYMPHATIC NEGATIVE: 1
GASTROINTESTINAL NEGATIVE: 1
RESPIRATORY NEGATIVE: 1
EYES NEGATIVE: 1
NEUROLOGICAL NEGATIVE: 1
CONSTITUTIONAL NEGATIVE: 1

## 2024-06-11 ASSESSMENT — PAIN SCALES - GENERAL: PAINLEVEL: 0-NO PAIN

## 2024-06-11 NOTE — PROGRESS NOTES
HIV PrEP Clinic Follow-up Visit:    SAlmita Hugo is a 23 y.o. male being seen for PrEP for prevention of HIV infection.  Current PrEP therapy:  Descovy    Risk factors for HIV infection include: (select all that apply):  MSM    Regular condom use? Never  Received treatment for STD since last seen? No    Past Medical History  Past Medical History:   Diagnosis Date    Chlamydia 05/03/2024    Elevated WBC count 05/03/2024    Gonorrhea 05/03/2024    Iron deficiency anemia 05/03/2024       Allergies  No Known Allergies    Current Medications:    Current Outpatient Medications:     emtricitabine-tenofovir alafen (Descovy) 120-15 mg tablet, Take by mouth., Disp: , Rfl:     escitalopram (Lexapro) 20 mg tablet, Take 1 tablet (20 mg) by mouth once daily., Disp: 90 tablet, Rfl: 2    albuterol 90 mcg/actuation inhaler, Inhale., Disp: , Rfl:     doxycycline (Vibra-Tabs) 100 mg tablet, Take 2 tablets after unprotected sexual encounter. Not to be taken daily. (Patient not taking: Reported on 6/11/2024), Disp: 30 tablet, Rfl: 0    Immunizations:  Immunization History   Administered Date(s) Administered    DTP 02/07/2001, 03/29/2001, 06/05/2001, 12/17/2001, 08/07/2006    Flu vaccine (IIV4), preservative free *Check age/dose* 10/13/2020, 10/12/2021    HPV 9-valent vaccine (GARDASIL 9) 08/18/2017    HPV, Quadrivalent 09/21/2016    Hepatitis B vaccine, adult (RECOMBIVAX, ENGERIX) 2000, 02/07/2001, 09/05/2001    MMR vaccine, subcutaneous (MMR II) 12/17/2001, 08/07/2006    Meningococcal ACWY-D (Menactra) 4-valent conjugate vaccine 04/29/2014, 06/21/2017    OPV 02/07/2001, 03/29/2001, 06/05/2001, 12/17/2001    PPD Test 05/18/2014, 06/11/2014    Pfizer Purple Cap SARS-CoV-2 04/01/2021, 04/22/2021    Pneumococcal polysaccharide vaccine, 23-valent, age 2 years and older (PNEUMOVAX 23) 02/07/2001, 03/29/2001, 06/05/2001    Poliovirus vaccine, subcutaneous (IPOL) 02/07/2001, 03/29/2001, 09/05/2001, 08/07/2006    Tdap  "vaccine, age 7 year and older (BOOSTRIX, ADACEL) 12/09/2011    Varicella vaccine, subcutaneous (VARIVAX) 12/17/2001, 08/07/2006       Review of systems  Review of Systems   Constitutional: Negative.    HENT: Negative.     Eyes: Negative.    Respiratory: Negative.     Cardiovascular: Negative.    Gastrointestinal: Negative.    Endocrine: Negative.    Genitourinary: Negative.    Musculoskeletal:         Stubbed his R big toe - but it is not swollen - no concerns with it today.    Skin: Negative.    Allergic/Immunologic: Negative.    Neurological: Negative.    Hematological: Negative.    Psychiatric/Behavioral: Negative.         PHYSICAL EXAMINATION:  Visit Vitals  /76 (BP Location: Right arm, Patient Position: Sitting, BP Cuff Size: Adult)   Pulse 68   Temp 36.5 °C (97.7 °F) (Temporal)   Resp 16   Ht 1.702 m (5' 7\")   Wt 72 kg (158 lb 12.8 oz)   SpO2 97%   BMI 24.87 kg/m²   Smoking Status Never   BSA 1.84 m²       Physical Exam   Physical Exam  Vitals reviewed.   Constitutional:       Appearance: Normal appearance.   HENT:      Head: Normocephalic.   Eyes:      Pupils: Pupils are equal, round, and reactive to light.   Cardiovascular:      Rate and Rhythm: Normal rate and regular rhythm.      Heart sounds: Normal heart sounds.   Pulmonary:      Effort: Pulmonary effort is normal.      Breath sounds: Normal breath sounds.   Abdominal:      General: Bowel sounds are normal.      Palpations: Abdomen is soft.   Musculoskeletal:         General: Normal range of motion.      Cervical back: Normal range of motion and neck supple.   Skin:     General: Skin is warm and dry.   Neurological:      Mental Status: He is alert and oriented to person, place, and time.   Psychiatric:         Mood and Affect: Mood normal.         Behavior: Behavior normal.         Pertinent data review:  HIV 1/2 Antigen/Antibody Screen with Reflex to Confirmation   Date Value Ref Range Status   03/11/2024 Nonreactive Nonreactive Final     Syphilis " "Total Ab   Date Value Ref Range Status   03/11/2024 Nonreactive Nonreactive Final     Comment:     No significant level of Treponema pallidum antibody detected.   Repeat testing in 2 to 4 weeks may be considered if early   infection or incubating syphilis infection is suspected.     No results found for: \"VDRLCSF\"  No results found for: \"RPR\"  Hepatitis C Ab   Date Value Ref Range Status   10/13/2020 NONREACTIVE NONREACTIVE Final     Comment:      Results from patients taking biotin supplements or receiving   high-dose biotin therapy should be interpreted with caution   due to possible interference with this test. Providers may    contact their local laboratory for further information.       Hepatitis B Surface Ag   Date Value Ref Range Status   10/13/2020 NONREACTIVE NONREACTIVE Final     Comment:      Biotin interference may cause falsely decreased results.   Patients taking a Biotin dose of up to 5 mg/day should   refrain from taking Biotin for 24 hours before sample   collection. Providers may contact their local laboratory   for further information.       Hepatitis B Surface Ab   Date Value Ref Range Status   10/13/2020 <3.1 <10 mIU/mL Final     Comment:     INTERPRETIVE CRITERIA:  <10 mIU/mL....NONREACTIVE    >=10 mIU/mL...REACTIVE   .   Biotin interference may cause falsely decreased results.   Patients taking a Biotin dose of up to 5 mg/day should   refrain from taking Biotin for 24 hours before sample   collection. Providers may contact their local laboratory   for further information.       Hep A Total Ab Interp   Date Value Ref Range Status   10/13/2020 NONREACTIVE NONREACTIVE Final     Comment:      Biotin interference may cause falsely elevated results.    Patients taking a Biotin dose of up to 5 mg/day should    refrain from taking Biotin for 24 hours before sample    collection. Providers may contact their local laboratory   for further information.       Glucose   Date Value Ref Range Status "   03/11/2024 94 74 - 99 mg/dL Final     Sodium   Date Value Ref Range Status   03/11/2024 140 136 - 145 mmol/L Final     Potassium   Date Value Ref Range Status   03/11/2024 4.1 3.5 - 5.3 mmol/L Final     Chloride   Date Value Ref Range Status   03/11/2024 101 98 - 107 mmol/L Final     Anion Gap   Date Value Ref Range Status   03/11/2024 12 10 - 20 mmol/L Final     Urea Nitrogen   Date Value Ref Range Status   03/11/2024 10 6 - 23 mg/dL Final     Creatinine   Date Value Ref Range Status   03/11/2024 0.79 0.50 - 1.30 mg/dL Final     eGFR   Date Value Ref Range Status   03/11/2024 >90 >60 mL/min/1.73m*2 Final     Comment:     Calculations of estimated GFR are performed using the 2021 CKD-EPI Study Refit equation without the race variable for the IDMS-Traceable creatinine methods.  https://jasn.asnjournals.org/content/early/2021/09/22/ASN.3673656631     Calcium   Date Value Ref Range Status   03/11/2024 9.9 8.6 - 10.6 mg/dL Final     Phosphorus   Date Value Ref Range Status   03/11/2024 2.9 2.5 - 4.9 mg/dL Final     Comment:     The performance characteristics of phosphorus testing in heparinized plasma have been validated by the individual  laboratory site where testing is performed. Testing on heparinized plasma is not approved by the FDA; however, such approval is not necessary.     Albumin   Date Value Ref Range Status   03/11/2024 5.1 (H) 3.4 - 5.0 g/dL Final       Assessment and Plan:  KIRK Hugo is a 23 y.o.male seen today for evaluation of appropriateness for continuation of  PrEP therapy as they continue to be at greater risk for acquiring HIV infection.  HIV Ag/Ab, Syphilis testing, and renal function will be collected today.  GC NAAT testing will be obtained from 2 site: throat, rectum.  If HIV testing negative, prescription for PrEP will be renewed.    Is using doxy pep correctly.   Did get the first mpoxx vaccine 2 years ago - will get the second one when we have it here in clinic.   He  feels he is up to date with covid shots.      Getting routine labs and swabs today.  Problem List Items Addressed This Visit    None  Visit Diagnoses       Exposure to potentially hazardous body fluids        Relevant Orders    C. Trachomatis / N. Gonorrhoeae, Amplified Detection    HIV 1/2 Antigen/Antibody Screen with Reflex to Confirmation    Syphilis Screen with Reflex    C. Trachomatis / N. Gonorrhoeae, Amplified Detection    High risk medication use        Relevant Orders    Renal Function Panel    Routine screening for STI (sexually transmitted infection)        Relevant Orders    C. Trachomatis / N. Gonorrhoeae, Amplified Detection        Thank you for coming in to see us today.   See you again in 6 months and please call with any questions or concerns.     Jasmyn Elliott, APRN-CNP

## 2024-06-12 LAB
C TRACH RRNA SPEC QL NAA+PROBE: NEGATIVE
N GONORRHOEA DNA SPEC QL PROBE+SIG AMP: NEGATIVE

## 2024-06-13 DIAGNOSIS — Z77.21 PERSONAL HISTORY OF EXPOSURE TO POTENTIALLY HAZARDOUS BODY FLUIDS: Primary | ICD-10-CM

## 2024-06-13 RX ORDER — EMTRICITABINE AND TENOFOVIR ALAFENAMIDE 200; 25 MG/1; MG/1
1 TABLET ORAL DAILY
Qty: 30 TABLET | Refills: 2 | Status: SHIPPED | OUTPATIENT
Start: 2024-06-13

## 2024-06-13 NOTE — TELEPHONE ENCOUNTER
PT had expressed no concerns with me.  PT stated they are doing well on their regimen.    Vernon Chisholm  HIV Prevention Specialist/PrEP Navigator

## 2024-08-12 DIAGNOSIS — Z77.21 PERSONAL HISTORY OF EXPOSURE TO POTENTIALLY HAZARDOUS BODY FLUIDS: ICD-10-CM

## 2024-08-13 ENCOUNTER — CLINICAL SUPPORT (OUTPATIENT)
Dept: IMMUNOLOGY | Facility: CLINIC | Age: 24
End: 2024-08-13
Payer: COMMERCIAL

## 2024-08-13 DIAGNOSIS — Z77.21 PERSONAL HISTORY OF EXPOSURE TO POTENTIALLY HAZARDOUS BODY FLUIDS: ICD-10-CM

## 2024-08-13 PROCEDURE — 87491 CHLMYD TRACH DNA AMP PROBE: CPT

## 2024-08-14 LAB
C TRACH RRNA SPEC QL NAA+PROBE: NEGATIVE
C TRACH RRNA SPEC QL NAA+PROBE: NEGATIVE
C TRACH RRNA SPEC QL NAA+PROBE: POSITIVE
N GONORRHOEA DNA SPEC QL PROBE+SIG AMP: NEGATIVE

## 2024-08-15 ENCOUNTER — PHARMACY VISIT (OUTPATIENT)
Dept: PHARMACY | Facility: CLINIC | Age: 24
End: 2024-08-15
Payer: MEDICARE

## 2024-08-15 DIAGNOSIS — A74.9 CHLAMYDIA: ICD-10-CM

## 2024-08-15 PROCEDURE — RXMED WILLOW AMBULATORY MEDICATION CHARGE

## 2024-08-15 RX ORDER — DOXYCYCLINE 100 MG/1
100 CAPSULE ORAL 2 TIMES DAILY
Qty: 14 CAPSULE | Refills: 0 | Status: SHIPPED | OUTPATIENT
Start: 2024-08-15 | End: 2024-08-22

## 2024-09-10 ENCOUNTER — APPOINTMENT (OUTPATIENT)
Dept: OTOLARYNGOLOGY | Facility: CLINIC | Age: 24
End: 2024-09-10
Payer: COMMERCIAL